# Patient Record
Sex: FEMALE | Race: WHITE | NOT HISPANIC OR LATINO | Employment: OTHER | URBAN - METROPOLITAN AREA
[De-identification: names, ages, dates, MRNs, and addresses within clinical notes are randomized per-mention and may not be internally consistent; named-entity substitution may affect disease eponyms.]

---

## 2017-01-04 ENCOUNTER — HOSPITAL ENCOUNTER (EMERGENCY)
Facility: OTHER | Age: 62
Discharge: HOME | End: 2017-01-05
Attending: EMERGENCY MEDICINE
Payer: COMMERCIAL

## 2017-01-04 VITALS
BODY MASS INDEX: 22.08 KG/M2 | DIASTOLIC BLOOD PRESSURE: 63 MMHG | SYSTOLIC BLOOD PRESSURE: 102 MMHG | RESPIRATION RATE: 19 BRPM | HEIGHT: 62 IN | OXYGEN SATURATION: 94 % | WEIGHT: 120 LBS | TEMPERATURE: 100 F | HEART RATE: 105 BPM

## 2017-01-04 DIAGNOSIS — R05.9 COUGH: ICD-10-CM

## 2017-01-04 DIAGNOSIS — J40 BRONCHITIS: Primary | ICD-10-CM

## 2017-01-04 PROCEDURE — 25000003 PHARM REV CODE 250: Performed by: EMERGENCY MEDICINE

## 2017-01-04 PROCEDURE — 96361 HYDRATE IV INFUSION ADD-ON: CPT

## 2017-01-04 PROCEDURE — 96365 THER/PROPH/DIAG IV INF INIT: CPT

## 2017-01-04 PROCEDURE — 93010 ELECTROCARDIOGRAM REPORT: CPT | Mod: ,,, | Performed by: INTERNAL MEDICINE

## 2017-01-04 PROCEDURE — 93005 ELECTROCARDIOGRAM TRACING: CPT

## 2017-01-04 PROCEDURE — 99284 EMERGENCY DEPT VISIT MOD MDM: CPT | Mod: 25

## 2017-01-04 PROCEDURE — 25000242 PHARM REV CODE 250 ALT 637 W/ HCPCS: Performed by: EMERGENCY MEDICINE

## 2017-01-04 PROCEDURE — 63600175 PHARM REV CODE 636 W HCPCS: Performed by: EMERGENCY MEDICINE

## 2017-01-04 PROCEDURE — 96366 THER/PROPH/DIAG IV INF ADDON: CPT

## 2017-01-04 PROCEDURE — 96375 TX/PRO/DX INJ NEW DRUG ADDON: CPT

## 2017-01-04 PROCEDURE — 94640 AIRWAY INHALATION TREATMENT: CPT

## 2017-01-04 RX ORDER — CODEINE PHOSPHATE AND GUAIFENESIN 10; 100 MG/5ML; MG/5ML
10 SOLUTION ORAL EVERY 4 HOURS PRN
Qty: 180 ML | Refills: 0 | Status: SHIPPED | OUTPATIENT
Start: 2017-01-04 | End: 2017-01-14

## 2017-01-04 RX ORDER — OXYCODONE AND ACETAMINOPHEN 10; 325 MG/1; MG/1
1 TABLET ORAL EVERY 4 HOURS PRN
COMMUNITY
End: 2021-07-13

## 2017-01-04 RX ORDER — PREDNISONE 20 MG/1
60 TABLET ORAL DAILY
Qty: 12 TABLET | Refills: 0 | Status: SHIPPED | OUTPATIENT
Start: 2017-01-04 | End: 2017-01-09

## 2017-01-04 RX ORDER — ALBUTEROL SULFATE 2.5 MG/.5ML
15 SOLUTION RESPIRATORY (INHALATION) ONCE
Status: COMPLETED | OUTPATIENT
Start: 2017-01-04 | End: 2017-01-04

## 2017-01-04 RX ORDER — MOXIFLOXACIN HYDROCHLORIDE 400 MG/250ML
400 INJECTION, SOLUTION INTRAVENOUS
Status: COMPLETED | OUTPATIENT
Start: 2017-01-04 | End: 2017-01-05

## 2017-01-04 RX ORDER — IPRATROPIUM BROMIDE AND ALBUTEROL SULFATE 2.5; .5 MG/3ML; MG/3ML
3 SOLUTION RESPIRATORY (INHALATION) ONCE
Status: DISCONTINUED | OUTPATIENT
Start: 2017-01-04 | End: 2017-01-04

## 2017-01-04 RX ORDER — TOPIRAMATE 25 MG/1
25 TABLET ORAL 2 TIMES DAILY
COMMUNITY

## 2017-01-04 RX ORDER — IPRATROPIUM BROMIDE AND ALBUTEROL SULFATE 2.5; .5 MG/3ML; MG/3ML
3 SOLUTION RESPIRATORY (INHALATION) ONCE
Status: COMPLETED | OUTPATIENT
Start: 2017-01-04 | End: 2017-01-04

## 2017-01-04 RX ORDER — MOXIFLOXACIN HYDROCHLORIDE 400 MG/1
400 TABLET ORAL DAILY
Qty: 10 TABLET | Refills: 0 | Status: SHIPPED | OUTPATIENT
Start: 2017-01-04 | End: 2017-01-14

## 2017-01-04 RX ORDER — ALBUTEROL SULFATE 0.63 MG/3ML
0.63 SOLUTION RESPIRATORY (INHALATION) EVERY 6 HOURS PRN
COMMUNITY

## 2017-01-04 RX ADMIN — MOXIFLOXACIN HYDROCHLORIDE 400 MG: 400 INJECTION, SOLUTION INTRAVENOUS at 11:01

## 2017-01-04 RX ADMIN — SODIUM CHLORIDE 1000 ML: 0.9 INJECTION, SOLUTION INTRAVENOUS at 09:01

## 2017-01-04 RX ADMIN — ALBUTEROL SULFATE 15 MG: 2.5 SOLUTION RESPIRATORY (INHALATION) at 09:01

## 2017-01-04 RX ADMIN — IPRATROPIUM BROMIDE AND ALBUTEROL SULFATE 3 ML: .5; 3 SOLUTION RESPIRATORY (INHALATION) at 08:01

## 2017-01-04 RX ADMIN — METHYLPREDNISOLONE SODIUM SUCCINATE 125 MG: 125 INJECTION, POWDER, FOR SOLUTION INTRAMUSCULAR; INTRAVENOUS at 11:01

## 2017-01-04 NOTE — ED AVS SNAPSHOT
[facility] EMERGENCY DEPARTMENT  [address] [facility] [address]               Katlin Navarro   2017  7:31 PM   ED    Description:  Female : 1955   Department:  [facility] Emergency Department           Your Care was Coordinated By:     Provider Role From To    Marissa Sandhu MD Attending Provider [id number] --      Reason for Visit     Shortness of Breath           Diagnoses this Visit        Comments    Bronchitis    -  Primary     Cough           ED Disposition     None           To Do List           Follow-up Information     Schedule an appointment as soon as possible for a visit with Howard Hernandez.    Contact information:    [address]  [place] [address] [phone]          Follow up with [facility] Emergency Department.    Specialty:  Emergency Medicine    Why:  If symptoms worsen    Contact information:    [address] [facility] [address]  [phone]       These Medications        Disp Refills Start End    moxifloxacin (AVELOX) 400 mg tablet 10 tablet 0 2017    Take 1 tablet (400 mg total) by mouth once daily. - Oral    [place] [facility] [place] #: [phone]       guaifenesin-codeine 100-10 mg/5 ml (TUSSI-ORGANIDIN NR)  mg/5 mL syrup 180 mL 0 2017    Take 10 mLs by mouth every 4 (four) hours as needed for Cough. - Oral    [place] [facility] [place] Ph #: [phone]       predniSONE (DELTASONE) 20 MG tablet 12 tablet 0 2017    Take 3 tablets (60 mg total) by mouth once daily. - Oral    [place]arm[place] [facility] [place] Ph #: [phone]         [facility] On Call     [facility] On [facility] Nurse Care Line -  Assistance  Registered nurses in the [facility] Call Center provide clinical advisement, health education, appointment booking, and other advisory  services.  Call for this free service at [phone].             Medications           Message regarding Medications     Verify the changes and/or additions to your medication regime listed below are the same as discussed with your clinician today.  If any of these changes or additions are incorrect, please notify your healthcare provider.        START taking these NEW medications        Refills    moxifloxacin (AVELOX) 400 mg tablet 0    Sig: Take 1 tablet (400 mg total) by mouth once daily.    Class: Print    Route: Oral    guaifenesin-codeine 100-10 mg/5 ml (TUSSI-ORGANIDIN NR)  mg/5 mL syrup 0    Sig: Take 10 mLs by mouth every 4 (four) hours as needed for Cough.    Class: Print    Route: Oral    predniSONE (DELTASONE) 20 MG tablet 0    Sig: Take 3 tablets (60 mg total) by mouth once daily.    Class: Print    Route: Oral      These medications were administered today        Dose Freq    albuterol-ipratropium 2.5mg-0.5mg/3mL nebulizer solution 3 mL 3 mL Once    Sig: Take 3 mLs by nebulization once.    Class: Normal    Route: Nebulization    albuterol sulfate nebulizer solution 15 mg 15 mg Once    Sig: Take 15 mg by nebulization once.    Class: Normal    Route: Nebulization    sodium chloride 0.9% bolus 1,000 mL 1,000 mL ED 1 Time    Sig: Inject 1,000 mLs into the vein ED 1 Time.    Class: Normal    Route: Intravenous    moxifloxacin 400 mg/250 mL IVPB 400 mg 400 mg ED 1 Time    Sig: Inject 250 mLs (400 mg total) into the vein ED 1 Time.    Class: Normal    Route: Intravenous    methylPREDNISolone sod suc(PF) 125 mg/2 mL injection 125 mg 125 mg ED 1 Time    Sig: Inject 125 mg into the vein ED 1 Time.    Class: Normal    Route: Intravenous           Verify that the below list of medications is an accurate representation of the medications you are currently taking.  If none reported, the list may be blank. If incorrect, please contact your healthcare provider. Carry this list with you in case of emergency.  "          Current Medications     albuterol (ACCUNEB) 0.63 mg/3 mL Nebu Take 0.63 mg by nebulization every 6 (six) hours as needed.    IPRATROPIUM/ALBUTEROL SULFATE (COMBIVENT INHL) Inhale into the lungs.    oxycodone-acetaminophen (PERCOCET)  mg per tablet Take 1 tablet by mouth every 4 (four) hours as needed for Pain.    topiramate (TOPAMAX) 25 MG tablet Take 25 mg by mouth 2 (two) times daily.    albuterol 90 mcg/actuation inhaler Inhale 1-2 puffs into the lungs every 6 (six) hours as needed for Wheezing.    albuterol sulfate nebulizer solution 15 mg Take 15 mg by nebulization once.    diazepam (VALIUM) 10 MG Tab Take 10 mg by mouth.    guaifenesin-codeine 100-10 mg/5 ml (TUSSI-ORGANIDIN NR)  mg/5 mL syrup Take 10 mLs by mouth every 4 (four) hours as needed for Cough.    methylPREDNISolone sod suc(PF) 125 mg/2 mL injection 125 mg Inject 125 mg into the vein ED 1 Time.    morphine (MS CONTIN) 15 MG 12 hr tablet Take 15 mg by mouth 2 (two) times daily.    moxifloxacin (AVELOX) 400 mg tablet Take 1 tablet (400 mg total) by mouth once daily.    moxifloxacin 400 mg/250 mL IVPB 400 mg Inject 250 mLs (400 mg total) into the vein ED 1 Time.    oxycodone-acetaminophen (NARVOX)  mg per tablet Take 1 tablet by mouth every 4 (four) hours as needed for Pain.    predniSONE (DELTASONE) 20 MG tablet Take 3 tablets (60 mg total) by mouth once daily.           Clinical Reference Information           Your Vitals Were     BP Pulse Temp Resp Height Weight    106/77 (BP Location: Left arm, Patient Position: Sitting) 81 99.6 °F (37.6 °C) (Oral) 19 5' 2" (1.575 m) 54.4 kg (120 lb)    SpO2 BMI             94% 21.95 kg/m2         Allergies as of 1/4/2017     No Known Allergies      Immunizations Administered on Date of Encounter - 1/4/2017     None      ED Micro, Lab, POCT     Start Ordered       Status Ordering Provider    01/04/17 2128 01/04/17 2128  POCT CMP  Once      Acknowledged     01/04/17 2128 01/04/17 2128  " POCT CBC  Once      Acknowledged     01/04/17 2128 01/04/17 2128  POCT B-type natriuretic peptide (BNP)  Once      Acknowledged       ED Imaging Orders     Start Ordered       Status Ordering Provider    01/04/17 2128 01/04/17 2128  X-Ray Chest PA And Lateral  1 time imaging      Final result         Discharge Instructions         Bronchitis, Antibiotic Treatment (Adult)    Bronchitis is an infection of the air passages (bronchial tubes) in your lungs. It often occurs when you have a cold. This illness is contagious during the first few days and is spread through the air by coughing and sneezing, or by direct contact (touching the sick person and then touching your own eyes, nose, or mouth).  Symptoms of bronchitis include cough with mucus (phlegm) and low-grade fever. Bronchitis usually lasts 7 to 14 days. Mild cases can be treated with simple home remedies. More severe infection is treated with an antibiotic.  Home care  Follow these guidelines when caring for yourself at home:  · If your symptoms are severe, rest at home for the first 2 to 3 days. When you go back to your usual activities, don't let yourself get too tired.  · Do not smoke. Also avoid being exposed to secondhand smoke.  · You may use over-the-counter medicines to control fever or pain, unless another medicine was prescribed. (Note: If you have chronic liver or kidney disease or have ever had a stomach ulcer or gastrointestinal bleeding, talk with your healthcare provider before using these medicines. Also talk to your provider if you are taking medicine to prevent blood clots.) Aspirin should never be given to anyone younger than 18 years of age who is ill with a viral infection or fever. It may cause severe liver or brain damage.  · Your appetite may be poor, so a light diet is fine. Avoid dehydration by drinking 6 to 8 glasses of fluids per day (such as water, soft drinks, sports drinks, juices, tea, or soup). Extra fluids will help loosen  secretions in the nose and lungs.  · Over-the-counter cough, cold, and sore-throat medicines will not shorten the length of the illness, but they may be helpful to reduce symptoms. (Note: Do not use decongestants if you have high blood pressure.)  · Finish all antibiotic medicine. Do this even if you are feeling better after only a few days.  Follow-up care  Follow up with your healthcare provider, or as advised. If you had an X-ray or ECG (electrocardiogram), a specialist will review it. You will be notified of any new findings that may affect your care.  Note: If you are age 65 or older, or if you have a chronic lung disease or condition that affects your immune system, or you smoke, talk to your healthcare provider about having pneumococcal vaccinations and a yearly influenza vaccination (flu shot).  When to seek medical advice  Call your healthcare provider right away if any of these occur:  · Fever of 100.4°F (38°C) or higher  · Coughing up increased amounts of colored sputum  · Weakness, drowsiness, headache, facial pain, ear pain, or a stiff neck  Call 911, or get immediate medical care  Contact emergency services right away if any of these occur.  · Coughing up blood  · Worsening weakness, drowsiness, headache, or stiff neck  · Trouble breathing, wheezing, or pain with breathing  © 2092-6603 [employer]. [address] [place] [address], [place] [address]. All rights reserved. This information is not intended as a substitute for professional medical care. Always follow your healthcare professional's instructions.          [facility] Sign-Up     Activating your [facility] account is as easy as 1-2-3!     1) Visit [email or web address], select Sign Up Now, enter this activation code and your date of birth, then select Next.  [email or web address]  Expires: 2/18/2017 11:19 PM      2) Create a username and password to use when you visit [facility] in the future and select a security question in case you lose your password  and select Next.    3) Enter your e-mail address and click Sign Up!    Additional Information  If you have questions, please e-mail [email or web address] or call [phone] to talk to our [facility] staff. Remember, [facility] is NOT to be used for urgent needs. For medical emergencies, dial 911.         Smoking Cessation     If you would like to quit smoking:   You may be eligible for free services if you are a [place] resident and started smoking cigarettes before September 1, 1988.  Call the Smoking Cessation Trust (SCT) toll free at (524) 963-5231 or (000) 132-1878.   Call 3-417-QUIT-NOW if you do not meet the above criteria.             [facility] Emergency Department complies with applicable Federal civil rights laws and does not discriminate on the basis of race, color, national origin, age, disability, or sex.        Language Assistance Services     ATTENTION: Language assistance services are available, free of charge. Please call [phone].      ATENCIÓN: Si habla terry, tiene a magana disposición servicios gratuitos de asistencia lingüística. Llame al [phone].     JESSY Ý: N?u b?n nói Ti?ng Vi?t, có các d?ch v? h? tr? ngôn ng? mi?n phí dành cho b?n. G?i s? [phone].

## 2017-01-05 PROCEDURE — 83880 ASSAY OF NATRIURETIC PEPTIDE: CPT

## 2017-01-05 PROCEDURE — 85025 COMPLETE CBC W/AUTO DIFF WBC: CPT

## 2017-01-05 PROCEDURE — 80053 COMPREHEN METABOLIC PANEL: CPT

## 2017-01-05 NOTE — DISCHARGE INSTRUCTIONS
Bronchitis, Antibiotic Treatment (Adult)    Bronchitis is an infection of the air passages (bronchial tubes) in your lungs. It often occurs when you have a cold. This illness is contagious during the first few days and is spread through the air by coughing and sneezing, or by direct contact (touching the sick person and then touching your own eyes, nose, or mouth).  Symptoms of bronchitis include cough with mucus (phlegm) and low-grade fever. Bronchitis usually lasts 7 to 14 days. Mild cases can be treated with simple home remedies. More severe infection is treated with an antibiotic.  Home care  Follow these guidelines when caring for yourself at home:  · If your symptoms are severe, rest at home for the first 2 to 3 days. When you go back to your usual activities, don't let yourself get too tired.  · Do not smoke. Also avoid being exposed to secondhand smoke.  · You may use over-the-counter medicines to control fever or pain, unless another medicine was prescribed. (Note: If you have chronic liver or kidney disease or have ever had a stomach ulcer or gastrointestinal bleeding, talk with your healthcare provider before using these medicines. Also talk to your provider if you are taking medicine to prevent blood clots.) Aspirin should never be given to anyone younger than 18 years of age who is ill with a viral infection or fever. It may cause severe liver or brain damage.  · Your appetite may be poor, so a light diet is fine. Avoid dehydration by drinking 6 to 8 glasses of fluids per day (such as water, soft drinks, sports drinks, juices, tea, or soup). Extra fluids will help loosen secretions in the nose and lungs.  · Over-the-counter cough, cold, and sore-throat medicines will not shorten the length of the illness, but they may be helpful to reduce symptoms. (Note: Do not use decongestants if you have high blood pressure.)  · Finish all antibiotic medicine. Do this even if you are feeling better after only a  few days.  Follow-up care  Follow up with your healthcare provider, or as advised. If you had an X-ray or ECG (electrocardiogram), a specialist will review it. You will be notified of any new findings that may affect your care.  Note: If you are age 65 or older, or if you have a chronic lung disease or condition that affects your immune system, or you smoke, talk to your healthcare provider about having pneumococcal vaccinations and a yearly influenza vaccination (flu shot).  When to seek medical advice  Call your healthcare provider right away if any of these occur:  · Fever of 100.4°F (38°C) or higher  · Coughing up increased amounts of colored sputum  · Weakness, drowsiness, headache, facial pain, ear pain, or a stiff neck  Call 911, or get immediate medical care  Contact emergency services right away if any of these occur.  · Coughing up blood  · Worsening weakness, drowsiness, headache, or stiff neck  · Trouble breathing, wheezing, or pain with breathing  © 1836-9830 [employer]. [address] [place] [address], [place] [address]. All rights reserved. This information is not intended as a substitute for professional medical care. Always follow your healthcare professional's instructions.

## 2017-01-05 NOTE — ED TRIAGE NOTES
Pt presents to ER with c/o sob and coughing for several days.  Was seen by pcp on Monday diagnosed with pneumonia and placed on zithromax,  She states she does not feel any better.  Pt is afebrile and has productive cough with chest pain upon coughing.  She has h/o COPD and continues to smoke cigarettes.  She is dyspneic while walking to room and sob on exertion.

## 2017-01-05 NOTE — ED PROVIDER NOTES
Encounter Date: 1/4/2017       History     Chief Complaint   Patient presents with    Shortness of Breath     c/o of SOB x 2-3 days. patient seen at the Doctor. Dx Pneumonia in the right lung placed on Zithromax taking mucinex with fluid no relief + nausea + cough. Dx with COPD x 1 yr ago. + smoking. Patient SOB with walking from lobby to [facility] Last breathing treatment at 1 pm     Review of patient's allergies indicates:  No Known Allergies  The history is provided by the patient and the spouse.   61-year-old complains of shortness of breath.  Patient has a history of COPD.  She has been short of breath for the last 2 days.  Patient had a cough productive of green sputum.  She denies fever.  She does have a sore throat.  She has nausea but no vomiting.  She has had a headache to the top of her head for the last 5 days.  The headache is been constant.  Patient takes chronic pain medicines.  She said this is not improving her headache.  She has had a decreased appetite.  Patient saw her primary care physician 3 days ago and was started on azithromycin. She denies chest pain no pain or swelling to her legs.    Past Medical History   Diagnosis Date    Cancer     COPD (chronic obstructive pulmonary disease)      No past medical history pertinent negatives.  Past Surgical History   Procedure Laterality Date    Stimultor      Throat surgery       cancer right     History reviewed. No pertinent family history.  Social History   Substance Use Topics    Smoking status: Current Every Day Smoker    Smokeless tobacco: None    Alcohol use No     Review of Systems   Constitutional: Negative for fever.   HENT: Positive for sore throat.    Eyes: Negative for pain and visual disturbance.   Respiratory: Positive for cough, shortness of breath and wheezing.    Cardiovascular: Negative for chest pain.   Gastrointestinal: Positive for nausea. Negative for abdominal pain.   Genitourinary: Positive for frequency. Negative for  dysuria.   Musculoskeletal: Negative for back pain.   Skin: Negative for rash.   Neurological: Positive for headaches.       Physical Exam   Initial Vitals   BP Pulse Resp Temp SpO2   01/04/17 1928 01/04/17 1928 01/04/17 1928 01/04/17 1928 01/04/17 1928   106/77 95 20 99.6 °F (37.6 °C) 95 %     Physical Exam    Nursing note and vitals reviewed.  Constitutional: She appears well-developed and well-nourished.   Continuous coughing   HENT:   Head: Normocephalic and atraumatic.   Mouth/Throat: No oropharyngeal exudate.   Eyes: Conjunctivae and EOM are normal. Pupils are equal, round, and reactive to light.   Neck: Normal range of motion. Neck supple.   Cardiovascular: Regular rhythm. Tachycardia present.    Pulmonary/Chest: No respiratory distress. She has wheezes. She has rhonchi.   Abdominal: Soft. There is no tenderness. There is no rebound and no guarding.   Musculoskeletal: Normal range of motion. She exhibits no edema or tenderness.   Neurological: She is alert and oriented to person, place, and time. She has normal strength.   Skin: Skin is warm and dry.   Psychiatric: She has a normal mood and affect.         ED Course   Procedures  Labs Reviewed   POCT CMP   POCT CBC   POCT B-TYPE NATRIURETIC PEPTIDE (BNP)     EKG Readings: (Independently Interpreted)   Sinus tachycardia, heart rate 103, no ST segment elevation, normal [place], normal QT          Medical Decision Making:   Initial Assessment:   Patient presents with cough and shortness of breath for the last 2 days.  Patient does have a coarse cough as well as diffuse rhonchi.  She has a fever of 99.6 and oxygen saturation 95%.  She is speaking in complete sentences.  Patient continues to smoke despite her chronic lung problems.  Clinical Tests:   Lab Tests: Ordered and Reviewed  Radiological Study: Ordered and Reviewed  Medical Tests: Ordered and Reviewed  ED Management:  Patient was given a DuoNeb.  She was also given a bolus of IV fluids.  She continued to have  wheezing so was then given an hour-long nebulizer treatment as well as 125 Solu-Medrol.  X-ray shows no evidence of pneumonia.  Labs were without significant abnormalities.  Patient was treated with Avelox.  I do not feel that she requires admission to the hospital since she does feel better after her treatment.                   ED Course     Clinical Impression:   The primary encounter diagnosis was Bronchitis. A diagnosis of Cough was also pertinent to this visit.          Marissa Sandhu MD  01/05/17 0244

## 2022-02-17 ENCOUNTER — IMMUNIZATION (OUTPATIENT)
Dept: PRIMARY CARE CLINIC | Facility: CLINIC | Age: 67
End: 2022-02-17
Payer: COMMERCIAL

## 2022-02-17 DIAGNOSIS — Z23 NEED FOR VACCINATION: Primary | ICD-10-CM

## 2022-02-17 PROCEDURE — 0064A COVID-19, MRNA, LNP-S, PF, 100 MCG/0.25 ML DOSE VACCINE (MODERNA BOOSTER): CPT | Mod: PBBFAC | Performed by: INTERNAL MEDICINE

## 2022-02-17 PROCEDURE — 91306 COVID-19, MRNA, LNP-S, PF, 100 MCG/0.25 ML DOSE VACCINE (MODERNA BOOSTER): CPT | Mod: PBBFAC | Performed by: INTERNAL MEDICINE
